# Patient Record
Sex: MALE | Race: WHITE | HISPANIC OR LATINO
[De-identification: names, ages, dates, MRNs, and addresses within clinical notes are randomized per-mention and may not be internally consistent; named-entity substitution may affect disease eponyms.]

---

## 2022-06-29 ENCOUNTER — TRANSCRIPTION ENCOUNTER (OUTPATIENT)
Age: 57
End: 2022-06-29

## 2022-06-29 ENCOUNTER — APPOINTMENT (OUTPATIENT)
Age: 57
End: 2022-06-29

## 2022-07-01 ENCOUNTER — APPOINTMENT (OUTPATIENT)
Dept: DISASTER EMERGENCY | Facility: HOSPITAL | Age: 57
End: 2022-07-01

## 2024-05-08 PROBLEM — Z00.00 ENCOUNTER FOR PREVENTIVE HEALTH EXAMINATION: Status: ACTIVE | Noted: 2024-05-08

## 2024-05-23 DIAGNOSIS — Z80.8 FAMILY HISTORY OF MALIGNANT NEOPLASM OF OTHER ORGANS OR SYSTEMS: ICD-10-CM

## 2024-05-23 DIAGNOSIS — Z82.49 FAMILY HISTORY OF ISCHEMIC HEART DISEASE AND OTHER DISEASES OF THE CIRCULATORY SYSTEM: ICD-10-CM

## 2024-05-23 DIAGNOSIS — Z82.3 FAMILY HISTORY OF STROKE: ICD-10-CM

## 2024-05-23 DIAGNOSIS — Z78.9 OTHER SPECIFIED HEALTH STATUS: ICD-10-CM

## 2024-05-23 DIAGNOSIS — Z83.1 FAMILY HISTORY OF OTHER INFECTIOUS AND PARASITIC DISEASES: ICD-10-CM

## 2024-05-23 RX ORDER — RIVAROXABAN 20 MG/1
20 TABLET, FILM COATED ORAL
Refills: 0 | Status: ACTIVE | COMMUNITY

## 2024-05-23 RX ORDER — MONTELUKAST SODIUM 10 MG/1
10 TABLET, FILM COATED ORAL
Refills: 0 | Status: ACTIVE | COMMUNITY

## 2024-05-23 RX ORDER — PRAVASTATIN SODIUM 40 MG/1
TABLET ORAL
Refills: 0 | Status: ACTIVE | COMMUNITY

## 2024-05-23 RX ORDER — OMEPRAZOLE 40 MG/1
CAPSULE, DELAYED RELEASE ORAL
Refills: 0 | Status: ACTIVE | COMMUNITY

## 2024-06-06 ENCOUNTER — NON-APPOINTMENT (OUTPATIENT)
Age: 59
End: 2024-06-06

## 2024-06-07 ENCOUNTER — APPOINTMENT (OUTPATIENT)
Dept: INTERNAL MEDICINE | Facility: CLINIC | Age: 59
End: 2024-06-07
Payer: COMMERCIAL

## 2024-06-07 ENCOUNTER — RESULT CHARGE (OUTPATIENT)
Age: 59
End: 2024-06-07

## 2024-06-07 ENCOUNTER — TRANSCRIPTION ENCOUNTER (OUTPATIENT)
Age: 59
End: 2024-06-07

## 2024-06-07 VITALS
DIASTOLIC BLOOD PRESSURE: 76 MMHG | HEIGHT: 71 IN | HEART RATE: 43 BPM | BODY MASS INDEX: 26.18 KG/M2 | OXYGEN SATURATION: 96 % | RESPIRATION RATE: 16 BRPM | SYSTOLIC BLOOD PRESSURE: 122 MMHG | TEMPERATURE: 97.8 F | WEIGHT: 187 LBS

## 2024-06-07 DIAGNOSIS — Z79.01 LONG TERM (CURRENT) USE OF ANTICOAGULANTS: ICD-10-CM

## 2024-06-07 DIAGNOSIS — Z86.711 PERSONAL HISTORY OF PULMONARY EMBOLISM: ICD-10-CM

## 2024-06-07 DIAGNOSIS — Z86.010 PERSONAL HISTORY OF COLONIC POLYPS: ICD-10-CM

## 2024-06-07 DIAGNOSIS — Z85.118 PERSONAL HISTORY OF OTHER MALIGNANT NEOPLASM OF BRONCHUS AND LUNG: ICD-10-CM

## 2024-06-07 DIAGNOSIS — Z00.00 ENCOUNTER FOR GENERAL ADULT MEDICAL EXAMINATION W/OUT ABNORMAL FINDINGS: ICD-10-CM

## 2024-06-07 DIAGNOSIS — Q21.12 PATENT FORAMEN OVALE: ICD-10-CM

## 2024-06-07 DIAGNOSIS — Z86.718 PERSONAL HISTORY OF OTHER VENOUS THROMBOSIS AND EMBOLISM: ICD-10-CM

## 2024-06-07 DIAGNOSIS — R31.29 OTHER MICROSCOPIC HEMATURIA: ICD-10-CM

## 2024-06-07 DIAGNOSIS — I77.810 THORACIC AORTIC ECTASIA: ICD-10-CM

## 2024-06-07 DIAGNOSIS — Z86.19 PERSONAL HISTORY OF OTHER INFECTIOUS AND PARASITIC DISEASES: ICD-10-CM

## 2024-06-07 DIAGNOSIS — E78.5 HYPERLIPIDEMIA, UNSPECIFIED: ICD-10-CM

## 2024-06-07 DIAGNOSIS — A04.8 OTHER SPECIFIED BACTERIAL INTESTINAL INFECTIONS: ICD-10-CM

## 2024-06-07 DIAGNOSIS — K20.0 EOSINOPHILIC ESOPHAGITIS: ICD-10-CM

## 2024-06-07 DIAGNOSIS — Z86.73 PERSONAL HISTORY OF TRANSIENT ISCHEMIC ATTACK (TIA), AND CEREBRAL INFARCTION W/OUT RESIDUAL DEFICITS: ICD-10-CM

## 2024-06-07 DIAGNOSIS — I47.10 SUPRAVENTRICULAR TACHYCARDIA, UNSPECIFIED: ICD-10-CM

## 2024-06-07 PROCEDURE — 93000 ELECTROCARDIOGRAM COMPLETE: CPT

## 2024-06-07 PROCEDURE — 99386 PREV VISIT NEW AGE 40-64: CPT

## 2024-06-07 NOTE — PHYSICAL EXAM
[TextEntry] : There are no skin lesions.  There is no adenopathy.  He is normocephalic, atraumatic.  Conjunctivae are pink. Sclerae are anicteric. Pupils are equal, round, reactive to light and accommodation. Extraocular motion is intact. Funduscopic exam is unremarkable. Neck is supple. Thyroid is not enlarged. Chest is clear.  There is no jugular venous distention. Pulses are 2+, equal, without bruits. S1 is normal.  S2 is physiologically split.  There are no murmurs or gallops. Abdomen is nontender, nondistended.  There is no hepatosplenomegaly.  There are no masses. Bowel sounds are normal and active in all 4 quadrants. Neurologically, he is intact.  He has mild tenderness over his left medial epicondyles.

## 2024-06-07 NOTE — HISTORY OF PRESENT ILLNESS
[FreeTextEntry1] : Patient is here today for comprehensive service.  He is now 59 years of age. [de-identified] : He is doing well following his resection for lung cancer.  He underwent surgery at [facility] in January.  Surgery was complicated by a massive pulmonary embolism on his second postoperative day.  He has resumed his Xarelto.  Other problems in addition to his lung cancer include hyperlipidemia, paroxysmal SVT, dilated aortic root, eosinophilic esophagitis, microhematuria (evaluated), left golfers elbow, long-term use of anticoagulation, history of CVA.  Additionally, he has a history of H. pylori infection which was treated.

## 2024-06-07 NOTE — HEALTH RISK ASSESSMENT
[Yes] : Yes [2 - 3 times a week (3 pts)] : 2 - 3  times a week (3 points) [1 or 2 (0 pts)] : 1 or 2 (0 points) [Never (0 pts)] : Never (0 points) [No] : In the past 12 months have you used drugs other than those required for medical reasons? No [No falls in past year] : Patient reported no falls in the past year [0] : 2) Feeling down, depressed, or hopeless: Not at all (0) [PHQ-2 Negative - No further assessment needed] : PHQ-2 Negative - No further assessment needed [Audit-CScore] : 3 [PKM5Lafks] : 0 [Never] : Never [0-4] : 0-4 [Patient reported colonoscopy was abnormal] : Patient reported colonoscopy was abnormal [Hepatitis C test offered] : Hepatitis C test offered [ColonoscopyDate] : 11/2022 [ColonoscopyComments] : Sessile polyp [HepatitisCDate] : 6/3/24

## 2024-06-07 NOTE — REVIEW OF SYSTEMS
[TextEntry] : Patient denies constitutional, eye, ENT, respiratory, cardiovascular, GI, , musculoskeletal, neurological, endocrine, hematological, skin or psychiatric symptoms unless otherwise noted.

## 2024-06-07 NOTE — PLAN
[FreeTextEntry1] : 1.  Health maintenance.  Recent labs reviewed.  Screening reviewed.  Colonoscopy will be November 2025 or 2026.  2.  Hyperlipidemia.  He will discuss increasing dose of pravastatin with his cardiologist.  He is at appropriate goal for primary prevention but not secondary prevention.  He did have a cryptogenic stroke.  3.  History of lung cancer.  He had surgery in January and is being closely followed.  He will have a CAT scan in July or August.  4.  History of DVT and pulmonary embolus.  He is presently on Xarelto but will likely switch to Eliquis.  5.  Long-term use of anticoagulation.  As above.  6.  Eosinophilic esophagitis.  He is on high-dose omeprazole.  He will supplement with vitamin B12.  Consideration will be given to reducing dose or switching to Nexium.  7.  Ascending aorta dilatation.  He is closely followed for this.  Should discuss with cardiologist whether his children need to be screened in light of his father's thoracic aneurysm.  8.  Microhematuria.  Urinalysis reviewed.  Previously evaluated.  9.  History of CVA.  No residual.  10.  Paroxysmal SVT.  Symptoms can be self terminated.  11.  Patent foramen ovale.  He consulted with an expert and did not have surgical closure as that he would not have affected his management.

## 2025-06-10 ENCOUNTER — RESULT CHARGE (OUTPATIENT)
Age: 60
End: 2025-06-10

## 2025-06-10 ENCOUNTER — APPOINTMENT (OUTPATIENT)
Dept: INTERNAL MEDICINE | Facility: CLINIC | Age: 60
End: 2025-06-10
Payer: COMMERCIAL

## 2025-06-10 ENCOUNTER — NON-APPOINTMENT (OUTPATIENT)
Age: 60
End: 2025-06-10

## 2025-06-10 VITALS
OXYGEN SATURATION: 96 % | SYSTOLIC BLOOD PRESSURE: 108 MMHG | DIASTOLIC BLOOD PRESSURE: 80 MMHG | TEMPERATURE: 97.7 F | HEART RATE: 49 BPM | BODY MASS INDEX: 26.32 KG/M2 | WEIGHT: 188 LBS | RESPIRATION RATE: 16 BRPM | HEIGHT: 70.75 IN

## 2025-06-10 PROBLEM — Z86.0100 HISTORY OF COLONIC POLYPS: Status: ACTIVE | Noted: 2024-05-23

## 2025-06-10 PROBLEM — Z00.00 HEALTHCARE MAINTENANCE: Status: ACTIVE | Noted: 2025-06-10

## 2025-06-10 PROBLEM — Z80.2 FAMILY HISTORY OF MALIGNANT NEOPLASM OF LARYNX: Status: ACTIVE | Noted: 2025-06-10

## 2025-06-10 PROBLEM — Z00.00 HEALTHCARE MAINTENANCE: Status: RESOLVED | Noted: 2024-05-20 | Resolved: 2025-06-10

## 2025-06-10 PROBLEM — Z23 ENCOUNTER FOR ADMINISTRATION OF VACCINE: Status: ACTIVE | Noted: 2025-06-10

## 2025-06-10 PROCEDURE — G0009: CPT

## 2025-06-10 PROCEDURE — 99396 PREV VISIT EST AGE 40-64: CPT | Mod: 25

## 2025-06-10 PROCEDURE — 93000 ELECTROCARDIOGRAM COMPLETE: CPT

## 2025-06-10 PROCEDURE — 90684 PCV21 VACCINE IM: CPT

## 2025-06-10 RX ORDER — APIXABAN 5 MG/1
5 TABLET, FILM COATED ORAL
Refills: 0 | Status: ACTIVE | COMMUNITY

## 2025-06-10 RX ORDER — LORATADINE 10 MG
CAPSULE ORAL
Refills: 0 | Status: ACTIVE | COMMUNITY

## 2025-08-12 ENCOUNTER — APPOINTMENT (OUTPATIENT)
Dept: INTERNAL MEDICINE | Facility: CLINIC | Age: 60
End: 2025-08-12
Payer: COMMERCIAL

## 2025-08-12 VITALS
TEMPERATURE: 97.6 F | DIASTOLIC BLOOD PRESSURE: 72 MMHG | SYSTOLIC BLOOD PRESSURE: 118 MMHG | WEIGHT: 183 LBS | OXYGEN SATURATION: 96 % | HEART RATE: 58 BPM | HEIGHT: 70.75 IN | BODY MASS INDEX: 25.62 KG/M2 | RESPIRATION RATE: 16 BRPM

## 2025-08-12 DIAGNOSIS — R35.0 FREQUENCY OF MICTURITION: ICD-10-CM

## 2025-08-12 DIAGNOSIS — R07.89 OTHER CHEST PAIN: ICD-10-CM

## 2025-08-12 PROCEDURE — 36415 COLL VENOUS BLD VENIPUNCTURE: CPT

## 2025-08-12 PROCEDURE — 99213 OFFICE O/P EST LOW 20 MIN: CPT

## 2025-08-13 LAB
ALBUMIN SERPL ELPH-MCNC: 4.3 G/DL
ALP BLD-CCNC: 78 U/L
ALT SERPL-CCNC: 32 U/L
ANION GAP SERPL CALC-SCNC: 15 MMOL/L
APPEARANCE: CLEAR
AST SERPL-CCNC: 30 U/L
BILIRUB SERPL-MCNC: 0.5 MG/DL
BILIRUBIN URINE: NEGATIVE
BLOOD URINE: NEGATIVE
BUN SERPL-MCNC: 28 MG/DL
CALCIUM SERPL-MCNC: 9.6 MG/DL
CHLORIDE SERPL-SCNC: 104 MMOL/L
CO2 SERPL-SCNC: 23 MMOL/L
COLOR: YELLOW
CREAT SERPL-MCNC: 1.18 MG/DL
EGFRCR SERPLBLD CKD-EPI 2021: 70 ML/MIN/1.73M2
GLUCOSE QUALITATIVE U: NEGATIVE MG/DL
GLUCOSE SERPL-MCNC: 91 MG/DL
KETONES URINE: NEGATIVE MG/DL
LEUKOCYTE ESTERASE URINE: NEGATIVE
NITRITE URINE: NEGATIVE
PH URINE: 6
POTASSIUM SERPL-SCNC: 4.7 MMOL/L
PROT SERPL-MCNC: 6.8 G/DL
PROTEIN URINE: NEGATIVE MG/DL
SODIUM SERPL-SCNC: 142 MMOL/L
SPECIFIC GRAVITY URINE: 1.03
UROBILINOGEN URINE: 0.2 MG/DL